# Patient Record
(demographics unavailable — no encounter records)

---

## 2024-11-07 NOTE — DISCUSSION/SUMMARY
[FreeTextEntry1] : Asthma possible COPD still actively smoking  Smoking counseling Keep Symbicort albuterol Asthma profile reviewed PFTs p Regroup after above Chest CT